# Patient Record
Sex: MALE | Race: BLACK OR AFRICAN AMERICAN | Employment: UNEMPLOYED
[De-identification: names, ages, dates, MRNs, and addresses within clinical notes are randomized per-mention and may not be internally consistent; named-entity substitution may affect disease eponyms.]

---
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
## 2018-09-12 ENCOUNTER — OFFICE VISIT (OUTPATIENT)
Dept: INTERNAL MEDICINE | Age: 18
End: 2018-09-12
Payer: COMMERCIAL

## 2018-09-12 VITALS
HEART RATE: 87 BPM | HEIGHT: 68 IN | SYSTOLIC BLOOD PRESSURE: 120 MMHG | WEIGHT: 146 LBS | DIASTOLIC BLOOD PRESSURE: 57 MMHG | BODY MASS INDEX: 22.13 KG/M2

## 2018-09-12 DIAGNOSIS — J30.9 ALLERGIC SINUSITIS: Primary | ICD-10-CM

## 2018-09-12 PROCEDURE — 1036F TOBACCO NON-USER: CPT | Performed by: INTERNAL MEDICINE

## 2018-09-12 PROCEDURE — 99211 OFF/OP EST MAY X REQ PHY/QHP: CPT | Performed by: INTERNAL MEDICINE

## 2018-09-12 PROCEDURE — G8420 CALC BMI NORM PARAMETERS: HCPCS | Performed by: INTERNAL MEDICINE

## 2018-09-12 PROCEDURE — G8427 DOCREV CUR MEDS BY ELIG CLIN: HCPCS | Performed by: INTERNAL MEDICINE

## 2018-09-12 PROCEDURE — 99213 OFFICE O/P EST LOW 20 MIN: CPT | Performed by: INTERNAL MEDICINE

## 2018-09-12 RX ORDER — LORATADINE AND PSEUDOEPHEDRINE 10; 240 MG/1; MG/1
1 TABLET, EXTENDED RELEASE ORAL DAILY
Status: CANCELLED | OUTPATIENT
Start: 2018-09-12

## 2018-09-12 RX ORDER — FLUTICASONE PROPIONATE 50 MCG
2 SPRAY, SUSPENSION (ML) NASAL DAILY
Qty: 16 G | Refills: 1 | Status: SHIPPED | OUTPATIENT
Start: 2018-09-12

## 2018-09-12 ASSESSMENT — PATIENT HEALTH QUESTIONNAIRE - PHQ9
SUM OF ALL RESPONSES TO PHQ QUESTIONS 1-9: 0
SUM OF ALL RESPONSES TO PHQ9 QUESTIONS 1 & 2: 0
SUM OF ALL RESPONSES TO PHQ QUESTIONS 1-9: 0
1. LITTLE INTEREST OR PLEASURE IN DOING THINGS: 0
2. FEELING DOWN, DEPRESSED OR HOPELESS: 0

## 2018-09-12 NOTE — LETTER
[facility]  [address] [facility] 29 [facility]  Phone: [phone]  Fax: [phone]    Sheila Winslow MD        September 12, 2018     Patient: Giancarlo Ellis   YOB: 2000   Date of Visit: 9/12/2018       To Whom it May Concern:    Lyudmila Lim was seen in my clinic on 9/12/2018. He may return to school on 9/12/18. If you have any questions or concerns, please don't hesitate to call.     Sincerely,         Sheila Winslow MD

## 2018-09-12 NOTE — PROGRESS NOTES
[facility]  [facility] [address] [facility]  [address]  Dept: [phone]      Today's Date: 9/12/2018  Patient Name: Jhonatan Rosario  Patient's age: 19 y.o., 2000        CHIEF COMPLAINT:    Chief Complaint   Patient presents with    Asthma     Pt last seen on 3/21/17   [facility]     Due for Flu and Pneumonia. Discuss with pt.  Other     Pt would like to discuss getting a fertility test done       History Obtained From:  patient    HISTORY OF PRESENT ILLNESS:      The patient is a 19 y.o. old  male and is here to Follow-up for asthma. His breathing is stable and he has not been using any inhaler for more than a year now. Denies any cough or wheezing. He has some nasal congestion off and on. Denies any fever or purulent drainage. No epistaxis. He wanted to get fertility tests. On further interview he denies any dysuria, penile discharge, hematuria, erectile dysfunction. He is not trying to get pregnant at this time. I counseled him that it will be more useful to get fertility test done if he fails to get pregnant. Patient Active Problem List   Diagnosis    Acne    Mild intermittent asthma without complication    Snoring    Sports physical       Past Medical History:   has a past medical history of Asthma and Heart murmur. Past Surgical History:   has no past surgical history on file. Medications:    Current Outpatient Prescriptions   Medication Sig Dispense Refill    desloratadine-pseudoephedrine (CLARINEX-D 24 HOUR) 5-240 MG per extended release tablet Take 1 tablet by mouth daily 10 tablet 1    acetaminophen (APAP EXTRA STRENGTH) 500 MG tablet Take 1 tablet by mouth every 6 hours as needed for Pain 120 tablet 3    sodium chloride (ALTAMIST SPRAY) 0.65 % nasal spray 1 spray by Nasal route as needed for Congestion 1 Bottle 3     No current facility-administered medications for this visit.

## 2022-07-28 ENCOUNTER — OFFICE VISIT (OUTPATIENT)
Dept: FAMILY MEDICINE CLINIC | Age: 22
End: 2022-07-28
Payer: MEDICAID

## 2022-07-28 VITALS
BODY MASS INDEX: 25.67 KG/M2 | SYSTOLIC BLOOD PRESSURE: 109 MMHG | HEIGHT: 68 IN | OXYGEN SATURATION: 97 % | TEMPERATURE: 97.6 F | HEART RATE: 77 BPM | DIASTOLIC BLOOD PRESSURE: 69 MMHG | WEIGHT: 169.4 LBS

## 2022-07-28 DIAGNOSIS — R30.0 DYSURIA: ICD-10-CM

## 2022-07-28 DIAGNOSIS — J45.20 MILD INTERMITTENT ASTHMA WITHOUT COMPLICATION: Primary | ICD-10-CM

## 2022-07-28 DIAGNOSIS — Z23 NEED FOR TDAP VACCINATION: ICD-10-CM

## 2022-07-28 DIAGNOSIS — Z76.89 ESTABLISHING CARE WITH NEW DOCTOR, ENCOUNTER FOR: ICD-10-CM

## 2022-07-28 PROCEDURE — 99204 OFFICE O/P NEW MOD 45 MIN: CPT

## 2022-07-28 PROCEDURE — G8427 DOCREV CUR MEDS BY ELIG CLIN: HCPCS

## 2022-07-28 PROCEDURE — 1036F TOBACCO NON-USER: CPT

## 2022-07-28 PROCEDURE — 90471 IMMUNIZATION ADMIN: CPT

## 2022-07-28 PROCEDURE — G8419 CALC BMI OUT NRM PARAM NOF/U: HCPCS

## 2022-07-28 PROCEDURE — 90715 TDAP VACCINE 7 YRS/> IM: CPT

## 2022-07-28 SDOH — ECONOMIC STABILITY: FOOD INSECURITY: WITHIN THE PAST 12 MONTHS, THE FOOD YOU BOUGHT JUST DIDN'T LAST AND YOU DIDN'T HAVE MONEY TO GET MORE.: NEVER TRUE

## 2022-07-28 SDOH — ECONOMIC STABILITY: FOOD INSECURITY: WITHIN THE PAST 12 MONTHS, YOU WORRIED THAT YOUR FOOD WOULD RUN OUT BEFORE YOU GOT MONEY TO BUY MORE.: NEVER TRUE

## 2022-07-28 ASSESSMENT — PATIENT HEALTH QUESTIONNAIRE - PHQ9
SUM OF ALL RESPONSES TO PHQ QUESTIONS 1-9: 0
1. LITTLE INTEREST OR PLEASURE IN DOING THINGS: 0
SUM OF ALL RESPONSES TO PHQ QUESTIONS 1-9: 0
SUM OF ALL RESPONSES TO PHQ9 QUESTIONS 1 & 2: 0
SUM OF ALL RESPONSES TO PHQ QUESTIONS 1-9: 0
2. FEELING DOWN, DEPRESSED OR HOPELESS: 0
SUM OF ALL RESPONSES TO PHQ QUESTIONS 1-9: 0

## 2022-07-28 ASSESSMENT — ENCOUNTER SYMPTOMS
SINUS PRESSURE: 0
DIARRHEA: 0
NAUSEA: 0
TROUBLE SWALLOWING: 0
EYE ITCHING: 0
EYE REDNESS: 0
VOMITING: 0
SHORTNESS OF BREATH: 0
ABDOMINAL PAIN: 0
CHEST TIGHTNESS: 0
SORE THROAT: 0
WHEEZING: 0
SINUS PAIN: 0
COUGH: 0
EYE DISCHARGE: 0

## 2022-07-28 ASSESSMENT — SOCIAL DETERMINANTS OF HEALTH (SDOH): HOW HARD IS IT FOR YOU TO PAY FOR THE VERY BASICS LIKE FOOD, HOUSING, MEDICAL CARE, AND HEATING?: NOT HARD AT ALL

## 2022-07-28 NOTE — PROGRESS NOTES
To   [address] [place] [address],[address]  [facility]  [id number]  7/28/2022    Marita Davis is a 23 y.o. male who presents today for his medical conditions and/or complaints as noted below. Marita Davis is scheduled today for Establish Care, Urinary Tract Infection (NO ISSUES AT THIS TIME ), and Exposure to STD (POSSIBLE )  . HPI:     This is a pleasant 75-year-old male presenting to the office to establish care and discuss concerns about STD exposure. Patient states approximately 3 to 4 weeks ago he had a 4-day episode of intermittent dysuria. He denied any other concerning symptoms to include discharge or sores on his penis. He states at that time he was having unprotected sex with 2 separate females. Since then he has been using condoms as protection and has had no further symptoms. Patient states that he does not drink much water and he also drinks an extensive amount of pop. He does have a history of asthma and has not had any issues with that in quite some time. Previous notes reviewed the patient's chart. Vitals:    07/28/22 1039   BP: 109/69   Pulse: 77   Temp: 97.6 °F (36.4 °C)   SpO2: 97%   Weight: 169 lb 6.4 oz (76.8 kg)   Height: 5' 8\" (1.727 m)      Past Medical History:   Diagnosis Date    Asthma     Heart murmur       No past surgical history on file.   Family History   Problem Relation Age of Onset    Cancer Maternal Grandmother         breast    High Blood Pressure Maternal Grandmother     Diabetes Maternal Grandfather     High Blood Pressure Maternal Grandfather      Social History     Tobacco Use    Smoking status: Former    Smokeless tobacco: Never    Tobacco comments:     smoke outside   Substance Use Topics    Alcohol use: No     Alcohol/week: 0.0 standard drinks      Current Outpatient Medications   Medication Sig Dispense Refill    fluticasone (FLONASE) 50 MCG/ACT nasal spray 2 sprays by Nasal route daily (Patient not taking: Reported on 7/28/2022) 16 g 1     No current facility-administered medications for this visit. No Known Allergies    Health Maintenance   Topic Date Due    Depression Screen  Never done    Hepatitis C screen  Never done    Pneumococcal 0-64 years Vaccine (1 - PCV) 07/27/2023 (Originally 5/25/2004)    Flu vaccine (1) 09/01/2022    DTaP/Tdap/Td vaccine (8 - Td or Tdap) 07/28/2032    Hepatitis A vaccine  Completed    Hepatitis B vaccine  Completed    Hib vaccine  Completed    HPV vaccine  Completed    Varicella vaccine  Completed    Meningococcal (ACWY) vaccine  Completed    COVID-19 Vaccine  Completed    HIV screen  Completed       Subjective:      Review of Systems   Constitutional:  Negative for chills, fatigue and fever. HENT:  Negative for ear discharge, ear pain, sinus pressure, sinus pain, sore throat and trouble swallowing. Eyes:  Negative for discharge, redness and itching. Respiratory:  Negative for cough, chest tightness, shortness of breath and wheezing. Cardiovascular:  Negative for chest pain. Gastrointestinal:  Negative for abdominal pain, diarrhea, nausea and vomiting. Genitourinary:  Negative for difficulty urinating. Musculoskeletal:  Negative for arthralgias and neck pain. Skin:  Negative for rash. Neurological:  Negative for dizziness, weakness, light-headedness and headaches. All other systems reviewed and are negative. Objective:     Physical Exam  Vitals reviewed. Constitutional:       General: He is not in acute distress. Appearance: Normal appearance. He is normal weight. He is not ill-appearing. HENT:      Head: Normocephalic and atraumatic. Nose: Nose normal.   Eyes:      Extraocular Movements: Extraocular movements intact. Conjunctiva/sclera: Conjunctivae normal.      Pupils: Pupils are equal, round, and reactive to light. Cardiovascular:      Rate and Rhythm: Normal rate and regular rhythm. Pulses: Normal pulses. Heart sounds: Normal heart sounds.  No murmur heard.  Pulmonary:      Effort: Pulmonary effort is normal. No respiratory distress. Breath sounds: Normal breath sounds. No wheezing, rhonchi or rales. Abdominal:      General: Abdomen is flat. Palpations: Abdomen is soft. Musculoskeletal:         General: Normal range of motion. Cervical back: Neck supple. Skin:     General: Skin is warm and dry. Capillary Refill: Capillary refill takes less than 2 seconds. Neurological:      General: No focal deficit present. Mental Status: He is alert and oriented to person, place, and time. Psychiatric:         Mood and Affect: Mood normal.        Assessment/Plan:      1. Mild intermittent asthma without complication  Assessment & Plan:   Asymptomatic, continue current treatment plan and lifestyle modifications recommended  2. Establishing care with new doctor, encounter for  3. Need for Tdap vaccination  -     Tdap, BOOSTRIX, (age 8 yrs+), IM  4. Dysuria     Dysuria-  Due to the intermittent episode being approximately 1 month prior and no further concerning symptoms I am not inclined to believe that this is an STD infection. Patient was educated to return to the office for a lab visit if he begins to have any new and concerning symptoms and that we can test for infection at that point time. However as he is asymptomatic at this time and has been so for multiple weeks, I do not feel testing is necessary at this time. Return in about 1 year (around 7/28/2023) for physical.  Orders Placed This Encounter   Procedures    Tdap, [address], (age 8 yrs+), IM     No orders of the defined types were placed in this encounter. Reviewed health maintenance, prior labs and imaging. Patient given educational materials - see patient instructions. Discussed use, benefit, and side effects of prescribed medications. Barriers to medication compliance addressed. All patient questions answered. Pt voiced understanding to plan of care.

## 2023-08-04 ENCOUNTER — OFFICE VISIT (OUTPATIENT)
Dept: FAMILY MEDICINE CLINIC | Age: 23
End: 2023-08-04
Payer: MEDICAID

## 2023-08-04 VITALS
SYSTOLIC BLOOD PRESSURE: 116 MMHG | WEIGHT: 157 LBS | HEART RATE: 70 BPM | HEIGHT: 68 IN | OXYGEN SATURATION: 98 % | BODY MASS INDEX: 23.79 KG/M2 | DIASTOLIC BLOOD PRESSURE: 72 MMHG

## 2023-08-04 DIAGNOSIS — Z00.00 ENCOUNTER FOR WELL ADULT EXAM WITHOUT ABNORMAL FINDINGS: Primary | ICD-10-CM

## 2023-08-04 DIAGNOSIS — Z23 NEED FOR PNEUMOCOCCAL VACCINATION: ICD-10-CM

## 2023-08-04 DIAGNOSIS — Z71.89 ACP (ADVANCE CARE PLANNING): ICD-10-CM

## 2023-08-04 PROCEDURE — 99497 ADVNCD CARE PLAN 30 MIN: CPT

## 2023-08-04 PROCEDURE — 90471 IMMUNIZATION ADMIN: CPT

## 2023-08-04 PROCEDURE — 90677 PCV20 VACCINE IM: CPT

## 2023-08-04 PROCEDURE — 99395 PREV VISIT EST AGE 18-39: CPT

## 2023-08-04 RX ORDER — PERMETHRIN 50 MG/G
CREAM TOPICAL
Qty: 2 EACH | Refills: 0 | Status: SHIPPED | OUTPATIENT
Start: 2023-08-04

## 2023-08-04 RX ORDER — LIDOCAINE 50 MG/G
1 PATCH TOPICAL EVERY 24 HOURS
COMMUNITY
Start: 2023-05-12 | End: 2023-08-04

## 2023-08-04 RX ORDER — KETOROLAC TROMETHAMINE 10 MG/1
10 TABLET, FILM COATED ORAL EVERY 6 HOURS PRN
COMMUNITY
Start: 2023-05-12 | End: 2023-08-04

## 2023-08-04 SDOH — ECONOMIC STABILITY: HOUSING INSECURITY
IN THE LAST 12 MONTHS, WAS THERE A TIME WHEN YOU DID NOT HAVE A STEADY PLACE TO SLEEP OR SLEPT IN A SHELTER (INCLUDING NOW)?: NO

## 2023-08-04 SDOH — ECONOMIC STABILITY: INCOME INSECURITY: HOW HARD IS IT FOR YOU TO PAY FOR THE VERY BASICS LIKE FOOD, HOUSING, MEDICAL CARE, AND HEATING?: NOT HARD AT ALL

## 2023-08-04 SDOH — ECONOMIC STABILITY: FOOD INSECURITY: WITHIN THE PAST 12 MONTHS, YOU WORRIED THAT YOUR FOOD WOULD RUN OUT BEFORE YOU GOT MONEY TO BUY MORE.: NEVER TRUE

## 2023-08-04 SDOH — ECONOMIC STABILITY: FOOD INSECURITY: WITHIN THE PAST 12 MONTHS, THE FOOD YOU BOUGHT JUST DIDN'T LAST AND YOU DIDN'T HAVE MONEY TO GET MORE.: NEVER TRUE

## 2023-08-04 ASSESSMENT — PATIENT HEALTH QUESTIONNAIRE - PHQ9
SUM OF ALL RESPONSES TO PHQ QUESTIONS 1-9: 0
2. FEELING DOWN, DEPRESSED OR HOPELESS: 0
1. LITTLE INTEREST OR PLEASURE IN DOING THINGS: 0
SUM OF ALL RESPONSES TO PHQ9 QUESTIONS 1 & 2: 0
SUM OF ALL RESPONSES TO PHQ QUESTIONS 1-9: 0

## 2023-08-04 NOTE — PROGRESS NOTES
Well Adult Note  Name: Marissa Alex Date: 2023   MRN: [id number] Sex: Male   Age: 20 y.o. Ethnicity:  / Mona Feliz   : 2000 Race: Black / George Cyr is here for well adult exam.  History:  Patient is here today for an annual physical. He has no new chronic health conditions to report. He does note that he has had intermittent itching in his pubic hair for the past 1-2 weeks. He denies any rash, penile discharge, pain/painless lumps or ulcerations. He is currently sexually active. Review of Systems   Constitutional:  Negative for chills, fatigue and fever. HENT:  Negative for ear discharge, ear pain, sinus pressure, sinus pain, sore throat and trouble swallowing. Eyes:  Negative for discharge, redness and itching. Respiratory:  Negative for cough, chest tightness, shortness of breath and wheezing. Cardiovascular:  Negative for chest pain. Gastrointestinal:  Negative for abdominal pain, diarrhea, nausea and vomiting. Genitourinary:  Negative for difficulty urinating. Musculoskeletal:  Negative for arthralgias and neck pain. Skin:  Negative for rash. Neurological:  Negative for dizziness, weakness, light-headedness and headaches. Psychiatric/Behavioral:  Negative for dysphoric mood, self-injury, sleep disturbance and suicidal ideas. The patient is not nervous/anxious. All other systems reviewed and are negative. No Known Allergies      Prior to Visit Medications    Medication Sig Taking? Authorizing Provider   permethrin (ELIMITE) 5 % cream Apply topically as directed Yes MONROE Bey - CNP         Past Medical History:   Diagnosis Date    Asthma     Heart murmur        No past surgical history on file.       Family History   Problem Relation Age of Onset    Cancer Maternal Grandmother         breast    High Blood Pressure Maternal Grandmother     Diabetes Maternal Grandfather     High Blood Pressure Maternal Grandfather

## 2023-08-04 NOTE — PATIENT INSTRUCTIONS
Advance Directives: Care Instructions  Overview  An advance directive is a legal way to state your wishes at the end of your life. It tells your family and your doctor what to do if you can't say what you want. There are two main types of advance directives. You can change them any time your wishes change. Living will. This form tells your family and your doctor your wishes about life support and other treatment. The form is also called a declaration. Medical power of . This form lets you name a person to make treatment decisions for you when you can't speak for yourself. This person is called a health care agent (health care proxy, health care surrogate). The form is also called a durable power of  for health care. If you do not have an advance directive, decisions about your medical care may be made by a family member, or by a doctor or a  who doesn't know you. It may help to think of an advance directive as a gift to the people who care for you. If you have one, they won't have to make tough decisions by themselves. For more information, including forms for your state, see the [address] [place] [address] website (www.caringinfo.org/planning/advance-directives/). Follow-up care is a key part of your treatment and safety. Be sure to make and go to all appointments, and call your doctor if you are having problems. It's also a good idea to know your test results and keep a list of the medicines you take. What should you include in an advance directive? Many states have a unique advance directive form. (It may ask you to address specific issues.) Or you might use a universal form that's approved by many states. If your form doesn't tell you what to address, it may be hard to know what to include in your advance directive. Use the questions below to help you get started. Who do you want to make decisions about your medical care if you are not able to?   What life-support measures do you want if you

## 2023-08-07 ASSESSMENT — ENCOUNTER SYMPTOMS
SORE THROAT: 0
NAUSEA: 0
TROUBLE SWALLOWING: 0
DIARRHEA: 0
CHEST TIGHTNESS: 0
SINUS PRESSURE: 0
COUGH: 0
SINUS PAIN: 0
SHORTNESS OF BREATH: 0
EYE REDNESS: 0
EYE ITCHING: 0
VOMITING: 0
WHEEZING: 0
ABDOMINAL PAIN: 0
EYE DISCHARGE: 0